# Patient Record
Sex: FEMALE | Race: WHITE
[De-identification: names, ages, dates, MRNs, and addresses within clinical notes are randomized per-mention and may not be internally consistent; named-entity substitution may affect disease eponyms.]

---

## 2019-09-06 ENCOUNTER — HOSPITAL ENCOUNTER (EMERGENCY)
Dept: HOSPITAL 76 - ED | Age: 74
Discharge: HOME | End: 2019-09-06
Payer: MEDICARE

## 2019-09-06 VITALS — DIASTOLIC BLOOD PRESSURE: 78 MMHG | SYSTOLIC BLOOD PRESSURE: 136 MMHG

## 2019-09-06 DIAGNOSIS — T14.8XXA: Primary | ICD-10-CM
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
## 2019-09-06 DIAGNOSIS — S06.0X9A: ICD-10-CM

## 2019-09-06 DIAGNOSIS — I10: ICD-10-CM
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
## 2019-09-06 LAB
CLARITY UR REFRACT.AUTO: CLEAR
GLUCOSE UR QL STRIP.AUTO: NEGATIVE MG/DL
KETONES UR QL STRIP.AUTO: NEGATIVE MG/DL
NITRITE UR QL STRIP.AUTO: NEGATIVE
PH UR STRIP.AUTO: 6.5 PH (ref 5–7.5)
PROT UR STRIP.AUTO-MCNC: NEGATIVE MG/DL
RBC # UR STRIP.AUTO: NEGATIVE /UL
SP GR UR STRIP.AUTO: 1.01 (ref 1–1.03)
UROBILINOGEN UR QL STRIP.AUTO: (no result) E.U./DL
UROBILINOGEN UR STRIP.AUTO-MCNC: NEGATIVE MG/DL

## 2019-09-06 PROCEDURE — 87086 URINE CULTURE/COLONY COUNT: CPT

## 2019-09-06 PROCEDURE — 70450 CT HEAD/BRAIN W/O DYE: CPT

## 2019-09-06 PROCEDURE — 99284 EMERGENCY DEPT VISIT MOD MDM: CPT

## 2019-09-06 PROCEDURE — 72125 CT NECK SPINE W/O DYE: CPT

## 2019-09-06 PROCEDURE — 99282 EMERGENCY DEPT VISIT SF MDM: CPT

## 2019-09-06 PROCEDURE — 81001 URINALYSIS AUTO W/SCOPE: CPT

## 2019-09-06 PROCEDURE — 81003 URINALYSIS AUTO W/O SCOPE: CPT

## 2019-09-06 NOTE — ED PHYSICIAN DOCUMENTATION
History of Present Illness





- Stated complaint


Stated Complaint: GLF/HEAD PX





- Chief complaint


Chief Complaint: Heent





- Additonal information


Additional information: 





This is a 74-year-old female with a history of a aneurysm s/p surgery many years

ago, as well as a stroke with some residual mild left-sided weakness, who 

presents after fall.  Patient with her son today and she was walking in the home

and she tripped over a rug landing on her left side of her head.  She denies any

chest pain, she has some soreness in the base of her neck, and she does have 

pain over the left side of her head.  Her son said that she was dazed 

immediately after the fall, and she did not remember the event.  An hour or 2 

later she told him that she had some swelling in her head and she does not 

remember falling at all, so he brought her here for further evaluation.  She 

currently is feeling well and according to the son is back to her baseline. She 

denies chest pain, fever, dysuria, shortness of breath.





Review of Systems


Constitutional: denies: Fever


Eyes: denies: Loss of vision


Cardiac: denies: Chest pain / pressure


Respiratory: denies: Dyspnea


GI: denies: Abdominal Pain


: denies: Dysuria


Skin: reports: Other (Hematoma to head)


Musculoskeletal: denies: Extremity pain


Neurologic: denies: Generalized weakness


Immunocompromised: denies: Immunocompromised





PD PAST MEDICAL HISTORY





- Past Medical History


Cardiovascular: Hypertension, High cholesterol


Musculoskeletal: Chronic back pain





- Past Surgical History


General: Bowel surgery


Ortho: Spine surgery


Neuro: Other





- Present Medications


Home Medications: 


                                Ambulatory Orders











 Medication  Instructions  Recorded  Confirmed


 


Aspirin  09/06/19 


 


Gabapentin  09/06/19 


 


Hydrochlorothiazide  09/06/19 


 


Lisinopril  09/06/19 


 


Morphine Sulfate [Ms Contin]  09/06/19 


 


Omeprazole  09/06/19 


 


Sertraline [Zoloft]  09/06/19 


 


Simvastatin  09/06/19 


 


buPROPion [Wellbutrin Sr]  09/06/19 














- Allergies


Allergies/Adverse Reactions: 


                                    Allergies











Allergy/AdvReac Type Severity Reaction Status Date / Time


 


No Known Drug Allergies Allergy   Verified 09/06/19 19:04














- Social History


Does the pt smoke?: No


Smoking Status: Never smoker





PD ED PE NORMAL





- Vitals


Vital signs reviewed: Yes





- General


General: Alert and oriented X 3, No acute distress





- HEENT


HEENT: Other (There is a 4 cm x 3 cm, and area of tenderness over the left 

frontal temporal region of the head.  There is no overlying laceration.  

Extraocular muscles are intact, there is no anterior facial tenderness.  

Remainder the head is atraumatic.)





- Neck


Neck: Supple, no meningeal sign, Other (Mild soreness at the base the neck in 

the C7 region, no bruising, no step-offs,)





- Cardiac


Cardiac: RRR, No murmur





- Respiratory


Respiratory: Clear bilaterally





- Abdomen


Abdomen: Normal bowel sounds, Soft, Non tender, Non distended





- Derm


Derm: Warm and dry





- Extremities


Extremities: No deformity





- Neuro


Neuro: Alert and oriented X 3, CNs 2-12 intact, No sensory deficit, Normal 

speech, Other (4+/5 strength in right shoulder and elbow extension, which is 

patients baseline. Otherwise patient hs 5/5 strength in all muscle groups 

symmetrically bilaterally.)





- Psych


Psych: Normal mood, Normal affect





Results





- Vitals


Vitals: 


                               Vital Signs - 24 hr











  09/06/19 09/06/19





  19:04 21:29


 


Temperature 36.5 C 


 


Heart Rate 63 69


 


Respiratory 16 18





Rate  


 


Blood Pressure 151/85 H 136/78 H


 


O2 Saturation 97 98








                                     Oxygen











O2 Source                      Room air

















- Labs


Labs: 


                                Laboratory Tests











  09/06/19





  20:10


 


Urine Color  YELLOW


 


Urine Clarity  CLEAR


 


Urine pH  6.5


 


Ur Specific Gravity  1.010


 


Urine Protein  NEGATIVE


 


Urine Glucose (UA)  NEGATIVE


 


Urine Ketones  NEGATIVE


 


Urine Occult Blood  NEGATIVE


 


Urine Nitrite  NEGATIVE


 


Urine Bilirubin  NEGATIVE


 


Urine Urobilinogen  0.2 (NORMAL)


 


Ur Leukocyte Esterase  NEGATIVE


 


Ur Microscopic Review  NOT INDICATED


 


Urine Culture Comments  NOT INDICATED














- Rads (name of study)


  ** CT head WO


Radiology: Other (No acute Intracranial bleed or fracture. Signs of past 

aneurysm.)





  ** CT c spine


Radiology: Other (No fracture or dislocation.)





PD MEDICAL DECISION MAKING





- ED course


Complexity details: considered differential (ICH, contusion, concussion, 

hematoma, fracture)


ED course: 


Pt presents after a mechanical fall tripping on the edge of a rug with head 

trauma. She is neurologically intact with only slight baseline left-sided 

weakness from a past CVA. CT head and C spine are negative for acute 

abnormality. UA obtained though patient is asymptomatic, and is negative. 

Patient continues to feel well and has no new symptoms. I discussed that she 

likely has a concussion and reviewed concussion care. I reviewed strict return 

precautions and PCP follow up and patient was discharged in the care of her son.








Departure





- Departure


Disposition: 01 Home, Self Care


Clinical Impression: 


 Hematoma





Concussion


Qualifiers:


 Encounter type: initial encounter Loss of consciousness presence/duration: with

LOC of unspecified duration Qualified Code(s): S06.0X9A - Concussion with loss 

of consciousness of unspecified duration, initial encounter





Condition: Good


Instructions:  ED Concussion, ED Hematoma


Follow-Up: 


Delores Bryan MD [Primary Care Provider] - Within 1 week (For follow up on 

concussion and hematoma.)


Comments: 


You were seen today because you fell and hit your head, we do not see signs of 

bleed or fracture in the head, you do have a collection of blood over your scalp

(a hematoma) that will take time to resorb.  You may ice this area, and take 

some Tylenol for discomfort.  If you develop weakness, numbness, confusion, 

return to the emergency department.  Otherwise please follow-up with your 

primary care provider.


Discharge Date/Time: 09/06/19 21:29

## 2019-09-06 NOTE — ED PHYSICIAN DOCUMENTATION
History of Present Illness





- Stated complaint


Stated Complaint: GLF/HEAD PX





- Chief complaint


Chief Complaint: Heent





PD PAST MEDICAL HISTORY





- Allergies


Allergies/Adverse Reactions: 


                                    Allergies











Allergy/AdvReac Type Severity Reaction Status Date / Time


 


No Known Drug Allergies Allergy   Verified 09/06/19 19:04














Results





- Vitals


Vitals: 





                               Vital Signs - 24 hr











  09/06/19





  19:04


 


Temperature 36.5 C


 


Heart Rate 63


 


Respiratory 16





Rate 


 


Blood Pressure 151/85 H


 


O2 Saturation 97








                                     Oxygen











O2 Source                      Room air

## 2019-09-06 NOTE — CT REPORT
Reason:  Fall and LOC

Procedure Date:  09/06/2019   

Accession Number:  [id number] / [id number]                    

Procedure:  CT  - CERVICAL SPINE WO CPT Code:  

 

FULL RESULT:

 

 

EXAM:

CT CERVICAL SPINE WITHOUT CONTRAST

 

DATE: 9/6/2019 07:56 PM.

 

HISTORY: 74-year-old female. Fall and LOC.

 

COMPARISONS: None.

 

TECHNIQUE: Thin-section axial images were acquired of the cervical spine 

without contrast. Post-processing: Coronal and sagittal reformats. Other: 

None.

 

In accordance with CT protocol optimization, one or more of the following 

dose reduction techniques were utilized for this exam: automated exposure 

control, adjustment of mA and/or KV based on patient size, or use of 

iterative reconstructive technique.

 

FINDINGS:

Alignment: No scoliosis or spondylolisthesis.

 

Bones: No fracture or bone lesion.

 

Interspace Levels/Facets:

C1-C2: Unremarkable.

 

C2-C3: Moderate bilateral facet arthropathy. Mild diffuse disk. Mild 

central canal narrowing. No bony foraminal.

 

C3-C4: Mild disk height loss. Moderate bilateral facet arthropathy. 

Moderate bilateral uncovertebral spurring. No significant central canal 

narrowing. Moderate bilateral foraminal narrowing.

 

C4-C5: Moderate disk height loss with moderate posterior disk osteophyte 

complex. Moderate bilateral uncovertebral spurring. Mild central canal 

narrowing. Moderate to severe bilateral foraminal narrowing.

 

C5-C6: Mild disk height loss. Small posterior disk osteophyte complex. 

Moderate bilateral uncovertebral spurring. No significant central canal 

entered. Mild bilateral foraminal narrowing.

 

C6-C7: Moderate left and mild right facet arthropathy. No significant 

bony central canal or foraminal narrowing.

 

C7-T1: Unremarkable.

 

Musculature: Normal. No fatty atrophy.

 

Other: The paravertebral and prevertebral soft tissues are unremarkable.

IMPRESSION: No acute fracture or traumatic subluxation. No prevertebral 

soft tissue swelling. Moderate multilevel degenerative spondylosis, as 

detailed above.

 

[facility]

## 2019-09-06 NOTE — CT REPORT
Reason:  Fall and LOC

Procedure Date:  09/06/2019   

Accession Number:  [id number] / [id number]                    

Procedure:  CT  - HEAD WO CPT Code:  

 

FULL RESULT:

 

 

EXAM:

CT HEAD

 

EXAM DATE: 9/6/2019 07:56 PM.

 

CLINICAL HISTORY: 74-year-old female. Fall and LOC.

 

COMPARISON: None.

 

TECHNIQUE: Multiaxial CT images were obtained from the foramen magnum to 

the vertex. Reformats: Sagittal and coronal. IV contrast: None.

 

In accordance with CT protocol optimization, one or more of the following 

dose reduction techniques were utilized for this exam: automated exposure 

control, adjustment of mA and/or KV based on patient size, or use of 

iterative reconstructive technique.

 

FINDINGS:

Parenchyma: The patient is status post prior bilateral pterional 

craniotomies with aneurysm clips at the MCA bifurcations bilaterally. No 

intraparenchymal hemorrhage. No evidence of mass, midline shift, or CT 

findings of acute infarction. Gray-white differentiation is distinct. 

Diffuse chronic microangiopathic white matter changes are evident. Cystic 

encephalomalacia right frontal operculum.

 

Extraaxial Spaces: Normal for age. No subdural or epidural collections 

identified.

 

Ventricles: The ventricles and cortical sulci are enlarged, consistent 

with age-related tissue loss.

 

Sinuses and orbits: Polypoid mucosal thickening maxillary sinuses 

bilaterally. Remaining Imaged paranasal sinuses, orbits, and mastoids 

show no significant abnormality.

 

Bones: No evidence of fracture status post bilateral pterional 

craniotomy. Hyperostosis frontalis interna.

 

Other: Anterolateral left frontal scalp soft tissue hematoma. No 

underlying fracture.

IMPRESSION:

1. Generalized age-related cortical atrophic changes without evidence of 

acute intracranial abnormality.

 

2. Anterolateral left frontal scalp soft tissue hematoma. No underlying 

fracture.

 

3. The patient is status post prior bilateral pterional craniotomies with 

aneurysm clips at the MCA bifurcations bilaterally. Cystic 

encephalomalacia right frontal operculum.

 

[facility]